# Patient Record
Sex: MALE | Race: WHITE | URBAN - METROPOLITAN AREA
[De-identification: names, ages, dates, MRNs, and addresses within clinical notes are randomized per-mention and may not be internally consistent; named-entity substitution may affect disease eponyms.]

---

## 2022-11-09 ENCOUNTER — HOSPITAL ENCOUNTER (OUTPATIENT)
Dept: PHYSICAL THERAPY | Age: 62
Discharge: HOME | End: 2022-11-09
Payer: COMMERCIAL

## 2022-11-09 PROCEDURE — 97162 PT EVAL MOD COMPLEX 30 MIN: CPT

## 2022-11-09 PROCEDURE — 97110 THERAPEUTIC EXERCISES: CPT

## 2022-11-09 PROCEDURE — 97535 SELF CARE MNGMENT TRAINING: CPT

## 2022-11-09 NOTE — PROGRESS NOTES
PHYSICAL THERAPY - DAILY TREATMENT NOTE    Patient Name: Devon Bowen        Date: 2022  : 1960   YES Patient  Verified  Visit #:     Insurance: Payor: Valent[employer] [place] / Plan: [address] [place] [address] [employer] / Product Type: HMO /      In time: 825 Out time: 518   Total Treatment Time: 50     BCBS/Medicare Time Tracking (below)   Total Timed Codes (min):  NA 1:1 Treatment Time:  NA     TREATMENT AREA =  Left ankle pain [M25.572]    SUBJECTIVE  Pain Level (on 0 to 10 scale):  2-4  / 10   Medication Changes/New allergies or changes in medical history, any new surgeries or procedures? NO    If yes, update Summary List   Subjective Functional Status/Changes:  []  No changes reported     Patient is a 58 y.o. male who presents to [facility] with complaints of L foot/ankle pain. Pt reports twisting ankle during pickle ball in early September and then feeling pop and strong pain in L calf. Modalities Rationale:     decrease edema, decrease inflammation, and decrease pain to improve patient's ability to perform pain-free ADLs.     min [] Estim, type/location:                                      []  att     []  unatt     []  w/US     []  w/ice    []  w/heat    min []  Mechanical Traction: type/lbs                   []  pro   []  sup   []  int   []  cont    []  before manual    []  after manual    min []  Ultrasound, settings/location:      min []  Iontophoresis w/ dexamethasone, location:                                               []  take home patch       []  in clinic   10 min [x]  Ice     []  Heat    location/position: L calf in longsit    min []  Vasopneumatic Device, press/temp:    If using vaso (only need to measure limb vaso being performed on)      pre-treatment girth :       post-treatment girth :       measured at (landmark location) :      min []  Other:    [x] Skin assessment post-treatment (if applicable):    [x]  intact    [x]  redness- no adverse reaction                  []redness - adverse reaction:        15 min Therapeutic Exercise:  [x]  See flow sheet   Rationale:      increase ROM, increase strength, improve coordination, and increase proprioception to improve the patients ability to perform unlimited ADLs. 10 min Self Care: Discussed activity modification and improtance of icing in order to dec inflammation and allow for appropriate tendon loading   Rationale:    increase ROM, increase strength, improve coordination, and increase proprioception to improve the patients ability to decrease symptoms    Billed With/As:   [x] TE   [] TA   [] Neuro   [] Self Care Patient Education: [x] Review HEP    [] Progressed/Changed HEP based on:   [] positioning   [] body mechanics   [] transfers   [] heat/ice application    [] other:      Other Objective/Functional Measures:    FOTO 51     Post Treatment Pain Level (on 0 to 10) scale:   3  / 10     ASSESSMENT  Assessment/Changes in Function:     See POC     []  See Progress Note/Recertification   Patient will continue to benefit from skilled PT services to modify and progress therapeutic interventions, address functional mobility deficits, address ROM deficits, address strength deficits, analyze and address soft tissue restrictions, and analyze and cue movement patterns to attain remaining goals. Progress toward goals / Updated goals:    See POC for new short and lia term goals.      PLAN  [x]  Upgrade activities as tolerated YES Continue plan of care   []  Discharge due to :    []  Other:      Therapist: Leni Kahn DPT    Date: 11/9/2022 Time: 1:44 PM     Future Appointments   Date Time Provider Clinton Baron   11/28/2022  8:40 AM Kaylie Davey PT [facility]   12/1/2022  8:40 AM Kaylie Davey PT [facility]

## 2022-11-09 NOTE — PROGRESS NOTES
[address] [place] [address] PHYSICAL THERAPY AT [address] [place] [address]  [address], [address] ,[address], [address]  Phone: [phone]  Fax: [phone] / [address] [address] PHYSICAL THERAPY SERVICES  Patient Name: Narayan Nunez : 1960   Medical   Diagnosis: Left ankle pain [M25.572] Treatment Diagnosis: L ankle pain   Onset Date: 2022     Referral Source: Serina Guevara MD [facility] [place]): 2022   Prior Hospitalization: See medical history Provider #: [id number]   Prior Level of Function: Surfing, pickle ball, kite boarding,    Comorbidities: High blood pressure, hear disease   Medications: Verified on Patient Summary List   The Plan of Care and following information is based on the information from the initial evaluation.   ===========================================================================================  Assessment / key information:  Patient is a 58 y.o. male who presents to [facility] with complaints of L foot/ankle pain. Pt reports twisting ankle during pickle ball in early September and then feeling pop and strong pain in L calf. Pt reports MRI revealed partial tearing in L achilles tendon. Pt reports symptoms have improved since September but he continues to experience pain with push off and ascending stairs. There is some moderate swelling along proximal achilles tendon. Pt reports some improvement with aircast but did not have relief of symptoms with heel lift. He is taking Maloxicam for pain and icing as needed if symptoms increase. Pt ambulates with short step lengths and dec push off on L. Sit to stand reveals inc pes planus collapse on L side.     Patient presents with the following:  AROM:L ankle: 5 deg DF, 50 deg PF, 30 deg IN, 15 deg EV; R ankle full and pain-free  PROM:similar with inc in DF by 5 deg; 15 deg DF with knee bent indicating limited gastroc length  MMT: PF 2/5, DF 4/5, IN 4-/5, EV 4-/5; R ankle 5/5  Special tests: no tenderness along ATFL. - ant drawer test    Patient signs and sx are consistent with achilles tendinopathy/partial tear of achilles tendon. An initial HEP was provided to address above deficits. Physical Therapy services will be beneficial in order to decrease pain, improve ROM strength and stability of L foot/ankle in order to resume recreational activity and return to PLOF.   ===========================================================================================  Eval Complexity: History MEDIUM  Complexity : 1-2 comorbidities / personal factors will impact the outcome/ POC ;  Examination  MEDIUM Complexity : 3 Standardized tests and measures addressing body structure, function, activity limitation and / or participation in recreation ; Presentation MEDIUM Complexity : Evolving with changing characteristics ; Decision Making MEDIUM Complexity : FOTO score of 26-74; Overall Complexity MEDIUM  Problem List: pain affecting function, decrease ROM, decrease strength, edema affecting function, impaired gait/ balance, decrease ADL/ functional abilitiies, decrease activity tolerance, decrease flexibility/ joint mobility, and decrease transfer abilities   Treatment Plan may include any combination of the following: Therapeutic exercise, Neuromuscular reeducation, Manual therapy, Therapeutic activity, Self care/home management, Electric stim unattended , Vasopneumatic device, and Gait training  Patient / Family readiness to learn indicated by: asking questions, trying to perform skills, and interest  Persons(s) to be included in education: patient (P)  Barriers to Learning/Limitations: None  Measures taken, if barriers to learning:    Patient Goal (s): \"Pain relief and return to normal\"   Patient self reported health status: good  Rehabilitation Potential: good  Short Term Goals: To be accomplished in  3  weeks:  Patient will be independent and compliant with initial HEP.   Patient will demonstrate improved L ankle DF to 10 deg in order to improve gait mechanics  Demonstrate ability to perform 30 seated heel raise without discomfort in order to resume standing strengthening. Long Term Goals: To be accomplished in  6  weeks:  Patient will be independent and compliant with progressive HEP for L ankle stability in order to maintain gains made with physical therapy  Improve FOTO score from 51 to > or = 73 in order to indicate improved ease with ADL's. Demonstrate ability to perform 3x10 eccentric heel raises with good stability in order to resume recreational activity. Report ability to ambulate 30 min with pain remaining below 2/10 in order to resume recreational fitness program.   Frequency / Duration:   Patient to be seen  2  times per week for 6  weeks:  Patient / Caregiver education and instruction: self care, activity modification, brace/ splint application, and exercises  Therapist Signature: Sindy Hadley DPT Date: 51/0/8514   Certification Period: NA Time: 8:22 AM   ===========================================================================================  I certify that the above Physical Therapy Services are being furnished while the patient is under my care. I agree with the treatment plan and certify that this therapy is necessary. Physician Signature:        Date:       Time:     Please sign and return to [facility] or you may fax the signed copy to [phone]. Thank you.

## 2022-11-28 ENCOUNTER — HOSPITAL ENCOUNTER (OUTPATIENT)
Dept: PHYSICAL THERAPY | Age: 62
Discharge: HOME | End: 2022-11-28
Payer: COMMERCIAL

## 2022-11-28 PROCEDURE — 97530 THERAPEUTIC ACTIVITIES: CPT

## 2022-11-28 PROCEDURE — 97112 NEUROMUSCULAR REEDUCATION: CPT

## 2022-11-28 PROCEDURE — 97110 THERAPEUTIC EXERCISES: CPT

## 2022-11-28 NOTE — PROGRESS NOTES
PHYSICAL THERAPY - DAILY TREATMENT NOTE    Patient Name: Brandy Ramirez        Date: 2022  : 1960   YES Patient  Verified  Visit #:   2   of   12  Insurance: Payor: Willa Sant[place] / Plan: [employer] HMO / Product Type: HMO /      In time: 840 Out time: 930   Total Treatment Time: 50     BCBS/Medicare Time Tracking (below)   Total Timed Codes (min):  NA 1:1 Treatment Time:  NA     TREATMENT AREA =  Left ankle pain [M25.572]    SUBJECTIVE  Pain Level (on 0 to 10 scale):  2  / 10   Medication Changes/New allergies or changes in medical history, any new surgeries or procedures? NO    If yes, update Summary List   Subjective Functional Status/Changes:  []  No changes reported     Pt reports he has been doing exercises daily  and has begun the calf raise machine at the gym. He also wore cowboy boots on  and the heel lift helped; he is now interested in heel lifts that the doctor offered him. Modalities Rationale:     decrease edema, decrease inflammation, and decrease pain to improve patient's ability to perform pain-free ADLs.     min [] Estim, type/location:                                      []  att     []  unatt     []  w/US     []  w/ice    []  w/heat    min []  Mechanical Traction: type/lbs                   []  pro   []  sup   []  int   []  cont    []  before manual    []  after manual    min []  Ultrasound, settings/location:      min []  Iontophoresis w/ dexamethasone, location:                                               []  take home patch       []  in clinic   10 min [x]  Ice     []  Heat    location/position: L achilles supine    min []  Vasopneumatic Device, press/temp:    If using vaso (only need to measure limb vaso being performed on)      pre-treatment girth :       post-treatment girth :       measured at (landmark location) :      min []  Other:    [x] Skin assessment post-treatment (if applicable):    [x]  intact    [x]  redness- no adverse reaction []redness - adverse reaction:        15 min Therapeutic Exercise:  [x]  See flow sheet   Rationale:      increase ROM, increase strength, improve coordination, and increase proprioception to improve the patients ability to perform unlimited ADLs. 15 min Therapeutic Activity: [x]  See flow sheet   Rationale:    increase ROM, increase strength, improve coordination, and increase proprioception to improve the patients ability to negotiate stairs without pain    10 min Neuromuscular Re-ed: [x]  See flow sheet   Rationale:    increase ROM, increase strength, improve coordination, and increase proprioception to improve the patients ability to improve stability       Billed With/As:   [x] TE   [] TA   [] Neuro   [] Self Care Patient Education: [x] Review HEP    [] Progressed/Changed HEP based on:   [] positioning   [] body mechanics   [] transfers   [] heat/ice application    [] other:      Other Objective/Functional Measures:    Initiated TE per FS    Significant improvement in DF ROM today to approx 20 deg     Post Treatment Pain Level (on 0 to 10) scale:   2  / 10     ASSESSMENT  Assessment/Changes in Function:     Pt was able to perform eccentric HR on TG with good control through full ROM but fatigued quickly and required rest breaks. Good tolerance to initial session with some ms soreness/fatigue felt by end of session but no pain reported. []  See Progress Note/Recertification   Patient will continue to benefit from skilled PT services to modify and progress therapeutic interventions, address functional mobility deficits, address ROM deficits, address strength deficits, analyze and address soft tissue restrictions, analyze and cue movement patterns, analyze and modify body mechanics/ergonomics, and assess and modify postural abnormalities to attain remaining goals. Progress toward goals / Updated goals:    Progressing towards STG 3.      PLAN  [x]  Upgrade activities as tolerated YES Continue plan of care   []  Discharge due to :    []  Other:      Therapist: Neida De Dios DPT    Date: 11/28/2022 Time: 11:38 AM     Future Appointments   Date Time Provider Clinton Baron   12/1/2022  8:40 AM Stewart Neumann PT [facility]

## 2022-12-01 ENCOUNTER — HOSPITAL ENCOUNTER (OUTPATIENT)
Dept: PHYSICAL THERAPY | Age: 62
Discharge: HOME | End: 2022-12-01
Payer: COMMERCIAL

## 2022-12-01 PROCEDURE — 97110 THERAPEUTIC EXERCISES: CPT

## 2022-12-01 PROCEDURE — 97112 NEUROMUSCULAR REEDUCATION: CPT

## 2022-12-01 PROCEDURE — 97530 THERAPEUTIC ACTIVITIES: CPT

## 2022-12-01 NOTE — PROGRESS NOTES
PHYSICAL THERAPY - DAILY TREATMENT NOTE    Patient Name: Zac Delaney        Date: 2022  : 1960   YES Patient  Verified  Visit #:   3   of   12  Insurance: Payor: Tawanda Soares / Plan: Lisa [place] HMO / Product Type: HMO /      In time: 866 Out time: 605   Total Treatment Time: 50     BCBS/Medicare Time Tracking (below)   Total Timed Codes (min):  NA 1:1 Treatment Time:  NA     TREATMENT AREA =  Left ankle pain [M25.572]    SUBJECTIVE  Pain Level (on 0 to 10 scale):  2-3  / 10   Medication Changes/New allergies or changes in medical history, any new surgeries or procedures? NO    If yes, update Summary List   Subjective Functional Status/Changes:  []  No changes reported     Pt reports he played 9 holes of golf on Wednesday without his cast and has had some soreness, He feels stronger but still has trouble walking uphill         Modalities Rationale:     decrease edema, decrease inflammation, and decrease pain to improve patient's ability to perform pain-free ADLs.     min [] Estim, type/location:                                      []  att     []  unatt     []  w/US     []  w/ice    []  w/heat    min []  Mechanical Traction: type/lbs                   []  pro   []  sup   []  int   []  cont    []  before manual    []  after manual    min []  Ultrasound, settings/location:      min []  Iontophoresis w/ dexamethasone, location:                                               []  take home patch       []  in clinic   10 min [x]  Ice     []  Heat    location/position: L achilles supine    min []  Vasopneumatic Device, press/temp:    If using vaso (only need to measure limb vaso being performed on)      pre-treatment girth :       post-treatment girth :       measured at (landmark location) :      min []  Other:    [x] Skin assessment post-treatment (if applicable):    [x]  intact    [x]  redness- no adverse reaction                  []redness - adverse reaction:        15 min Therapeutic Exercise:  [x]  See flow sheet   Rationale:      increase ROM, increase strength, improve coordination, and increase proprioception to improve the patients ability to perform unlimited ADLs. 15 min Therapeutic Activity: [x]  See flow sheet   Rationale:    increase ROM, increase strength, improve coordination, and increase proprioception to improve the patients ability to negotiate stairs without pain    10 min Neuromuscular Re-ed: [x]  See flow sheet   Rationale:    increase ROM, increase strength, improve coordination, and increase proprioception to improve the patients ability to improve stability       Billed With/As:   [x] TE   [] TA   [] Neuro   [] Self Care Patient Education: [x] Review HEP    [] Progressed/Changed HEP based on:   [] positioning   [] body mechanics   [] transfers   [] heat/ice application    [] other:      Other Objective/Functional Measures: Added standing tilt board FWD/Back, increased PRE's as appropriate     Post Treatment Pain Level (on 0 to 10) scale:   2 / 10     ASSESSMENT  Assessment/Changes in Function:     Cues to perform HR through full ROM and improve eccentric control while lowering. Some lateral collapse that was corrected with cues; plan to work to improve post tib strength. []  See Progress Note/Recertification   Patient will continue to benefit from skilled PT services to modify and progress therapeutic interventions, address functional mobility deficits, address ROM deficits, address strength deficits, analyze and address soft tissue restrictions, analyze and cue movement patterns, analyze and modify body mechanics/ergonomics, and assess and modify postural abnormalities to attain remaining goals. Progress toward goals / Updated goals:    Progressing towards STG 3.      PLAN  [x]  Upgrade activities as tolerated YES Continue plan of care   []  Discharge due to :    []  Other:      Therapist: jM Drake DPT    Date: 12/1/2022 Time: 11:38 AM     Future Appointments   Date Time Provider Clinton Baron   12/6/2022 12:20 PM Amy Hardin, PT [facility]

## 2022-12-05 ENCOUNTER — HOSPITAL ENCOUNTER (OUTPATIENT)
Dept: PHYSICAL THERAPY | Age: 62
Discharge: HOME | End: 2022-12-05
Payer: COMMERCIAL

## 2022-12-05 PROCEDURE — 97112 NEUROMUSCULAR REEDUCATION: CPT

## 2022-12-05 PROCEDURE — 97110 THERAPEUTIC EXERCISES: CPT

## 2022-12-05 PROCEDURE — 97530 THERAPEUTIC ACTIVITIES: CPT

## 2022-12-05 NOTE — PROGRESS NOTES
PHYSICAL THERAPY - DAILY TREATMENT NOTE    Patient Name: Stephy Ruiz        Date: 2022  : 1960   YES Patient  Verified  Visit #:   4   of   12  Insurance: Payor: Rosio Stern / Plan: Natalia [place] HMO / Product Type: HMO /      In time: 800 Out time: 850   Total Treatment Time: 50     BCBS/Medicare Time Tracking (below)   Total Timed Codes (min):  NA 1:1 Treatment Time:  NA     TREATMENT AREA =  Left ankle pain [M25.572]    SUBJECTIVE  Pain Level (on 0 to 10 scale):  2-3  / 10   Medication Changes/New allergies or changes in medical history, any new surgeries or procedures? NO    If yes, update Summary List   Subjective Functional Status/Changes:  []  No changes reported     Pt reports there is always a steady ache in his calf. Modalities Rationale:     decrease edema, decrease inflammation, and decrease pain to improve patient's ability to perform pain-free ADLs.     min [] Estim, type/location:                                      []  att     []  unatt     []  w/US     []  w/ice    []  w/heat    min []  Mechanical Traction: type/lbs                   []  pro   []  sup   []  int   []  cont    []  before manual    []  after manual    min []  Ultrasound, settings/location:      min []  Iontophoresis w/ dexamethasone, location:                                               []  take home patch       []  in clinic   10 min [x]  Ice     []  Heat    location/position: L achilles supine    min []  Vasopneumatic Device, press/temp:    If using vaso (only need to measure limb vaso being performed on)      pre-treatment girth :       post-treatment girth :       measured at (landmark location) :      min []  Other:    [x] Skin assessment post-treatment (if applicable):    [x]  intact    [x]  redness- no adverse reaction                  []redness - adverse reaction:        15 min Therapeutic Exercise:  [x]  See flow sheet   Rationale:      increase ROM, increase strength, improve coordination, and increase proprioception to improve the patients ability to perform unlimited ADLs. 15 min Therapeutic Activity: [x]  See flow sheet   Rationale:    increase ROM, increase strength, improve coordination, and increase proprioception to improve the patients ability to negotiate stairs without pain    10 min Neuromuscular Re-ed: [x]  See flow sheet   Rationale:    increase ROM, increase strength, improve coordination, and increase proprioception to improve the patients ability to improve stability       Billed With/As:   [x] TE   [] TA   [] Neuro   [] Self Care Patient Education: [x] Review HEP    [] Progressed/Changed HEP based on:   [] positioning   [] body mechanics   [] transfers   [] heat/ice application    [] other:      Other Objective/Functional Measures: Added standing HR 60/40 R/L, added rebounder toss on blue pad    Some swelling remains in L achilles tendon     Post Treatment Pain Level (on 0 to 10) scale:   2  / 10     ASSESSMENT  Assessment/Changes in Function:     Eccentric training on TG was slightly provocative today as expected. Pt was again encouraged to focus on maintaining low levels of pain during the week, ice frequently and look into heel lift for symptom management. []  See Progress Note/Recertification   Patient will continue to benefit from skilled PT services to modify and progress therapeutic interventions, address functional mobility deficits, address ROM deficits, address strength deficits, analyze and address soft tissue restrictions, analyze and cue movement patterns, analyze and modify body mechanics/ergonomics, and assess and modify postural abnormalities to attain remaining goals. Progress toward goals / Updated goals:    Progressing towards STG 3.      PLAN  [x]  Upgrade activities as tolerated YES Continue plan of care   []  Discharge due to :    []  Other:      Therapist: Patric Barthel, DPT    Date: 12/5/2022 Time: 11:38 AM     Future Appointments   Date Time Provider Clinton Baron   12/13/2022  9:00 AM Emerson Ricci, PT [facility]   12/15/2022  9:20 AM Emerson Ricci, PT [facility]   12/19/2022  9:20 AM Emerson Ricci, PT [facility]   12/22/2022  9:20 AM Emerson Ricci, PT [facility]   12/27/2022  9:40 AM Emerson Ricci, PT [facility]   12/30/2022  9:20 AM Emerson Ricci, PT [facility]

## 2022-12-06 ENCOUNTER — APPOINTMENT (OUTPATIENT)
Dept: PHYSICAL THERAPY | Age: 62
End: 2022-12-06
Payer: COMMERCIAL

## 2022-12-12 ENCOUNTER — TELEPHONE (OUTPATIENT)
Dept: PHYSICAL THERAPY | Age: 62
End: 2022-12-12

## 2022-12-12 ENCOUNTER — HOSPITAL ENCOUNTER (OUTPATIENT)
Dept: PHYSICAL THERAPY | Age: 62
Discharge: HOME | End: 2022-12-12
Payer: COMMERCIAL

## 2022-12-12 PROCEDURE — 97110 THERAPEUTIC EXERCISES: CPT

## 2022-12-12 PROCEDURE — 97530 THERAPEUTIC ACTIVITIES: CPT

## 2022-12-12 PROCEDURE — 97112 NEUROMUSCULAR REEDUCATION: CPT

## 2022-12-12 NOTE — PROGRESS NOTES
PHYSICAL THERAPY - DAILY TREATMENT NOTE    Patient Name: Orlando Krishnamurthy        Date: 2022  : 1960   YES Patient  Verified  Visit #:     Insurance: Payor: Davi Potter / Plan: Micah Aguilar HMO / Product Type: HMO /      In time: 1000 Out time: 1050   Total Treatment Time: 50     BCBS/Medicare Time Tracking (below)   Total Timed Codes (min):  NA 1:1 Treatment Time:  NA     TREATMENT AREA =  Left ankle pain [M25.572]    SUBJECTIVE  Pain Level (on 0 to 10 scale):  2-3   10   Medication Changes/New allergies or changes in medical history, any new surgeries or procedures? NO    If yes, update Summary List   Subjective Functional Status/Changes:  []  No changes reported     Pt reports he felt really good last week until he played 18 holes of golf and walked. That seems to  be the most aggravating thing         Modalities Rationale:     decrease edema, decrease inflammation, and decrease pain to improve patient's ability to perform pain-free ADLs.     min [] Estim, type/location:                                      []  att     []  unatt     []  w/US     []  w/ice    []  w/heat    min []  Mechanical Traction: type/lbs                   []  pro   []  sup   []  int   []  cont    []  before manual    []  after manual    min []  Ultrasound, settings/location:      min []  Iontophoresis w/ dexamethasone, location:                                               []  take home patch       []  in clinic   10 min [x]  Ice     []  Heat    location/position: L achilles supine    min []  Vasopneumatic Device, press/temp:    If using vaso (only need to measure limb vaso being performed on)      pre-treatment girth :       post-treatment girth :       measured at (landmark location) :      min []  Other:    [x] Skin assessment post-treatment (if applicable):    [x]  intact    [x]  redness- no adverse reaction                  []redness - adverse reaction:        15 min Therapeutic Exercise:  [x]  See flow sheet Rationale:      increase ROM, increase strength, improve coordination, and increase proprioception to improve the patients ability to perform unlimited ADLs. 15 min Therapeutic Activity: [x]  See flow sheet   Rationale:    increase ROM, increase strength, improve coordination, and increase proprioception to improve the patients ability to negotiate stairs without pain    10 min Neuromuscular Re-ed: [x]  See flow sheet   Rationale:    increase ROM, increase strength, improve coordination, and increase proprioception to improve the patients ability to improve stability       Billed With/As:   [x] TE   [] TA   [] Neuro   [] Self Care Patient Education: [x] Review HEP    [] Progressed/Changed HEP based on:   [] positioning   [] body mechanics   [] transfers   [] heat/ice application    [] other:      Other Objective/Functional Measures: Added towel scrunch and mobo board     Post Treatment Pain Level (on 0 to 10) scale:   2  / 10     ASSESSMENT  Assessment/Changes in Function:     Discussed purchasing insoles to control for pes planus collapse during walking and excessive lateral movements to compensate for this collapse. Good tolerance to progression today although weakness with L PF remains     []  See Progress Note/Recertification   Patient will continue to benefit from skilled PT services to modify and progress therapeutic interventions, address functional mobility deficits, address ROM deficits, address strength deficits, analyze and address soft tissue restrictions, analyze and cue movement patterns, analyze and modify body mechanics/ergonomics, and assess and modify postural abnormalities to attain remaining goals. Progress toward goals / Updated goals:    Progressing towards STG 3.      PLAN  [x]  Upgrade activities as tolerated YES Continue plan of care   []  Discharge due to :    []  Other:      Therapist: Patric Barthel, DPT    Date: 12/12/2022 Time: 11:38 AM     Future Appointments   Date Time Provider Department Center   12/15/2022  9:20 AM Devang Thayer, PT [facility]   12/19/2022  8:00 AM Devang Thayer, PT [facility]   12/22/2022  9:20 AM Devang Thayer, PT [facility]   12/27/2022  9:40 AM Devang Thayer, PT [facility]   12/30/2022  9:20 AM Devang Thayer, PT [facility]

## 2022-12-13 ENCOUNTER — APPOINTMENT (OUTPATIENT)
Dept: PHYSICAL THERAPY | Age: 62
End: 2022-12-13
Payer: COMMERCIAL

## 2022-12-15 ENCOUNTER — HOSPITAL ENCOUNTER (OUTPATIENT)
Dept: PHYSICAL THERAPY | Age: 62
Discharge: HOME | End: 2022-12-15
Payer: COMMERCIAL

## 2022-12-15 PROCEDURE — 97112 NEUROMUSCULAR REEDUCATION: CPT

## 2022-12-15 PROCEDURE — 97530 THERAPEUTIC ACTIVITIES: CPT

## 2022-12-15 PROCEDURE — 97110 THERAPEUTIC EXERCISES: CPT

## 2022-12-15 NOTE — PROGRESS NOTES
[address] [place] [address] PHYSICAL THERAPY AT [address] [place] [address] [address], [address] [place] [address], [address]  Phone: [phone]  Fax: [phone]  PROGRESS NOTE  Patient Name: Cathy Billy : 1960   Treatment/Medical Diagnosis: Left ankle pain [M25.572]   Referral Source: Naida Hammans, MD     Date of Initial Visit: 2022 Attended Visits: 6 Missed Visits: 0     SUMMARY OF TREATMENT  Stretching and strengthening of L ankle, balance/proprioception training, strengthening of L gastroc/soleus complex, patient education and gait training, CP to dec inflammation  CURRENT STATUS  Mr. Ivette Elizondo has been seen for 5 follow up visits and is demonstrating slow but steady progress in strength. Pt reports a constant 2-3/10 aching pain in L achilles that increases to a 5/10 when walking long distance or standing to pedal on his bike. Pt is showing slow improvements in gastroc strength with improved push off during ambulation and minimal limp. He still is unable to perform HR in standing without assistance from RLE and UE. Pt has been extensively educated on importance of maintaining low levels of pain and inflammation in order to improve tissue extensibility and tensile strength. Pt has transitioned out of air cast and is using arch support with minor heel lift in shoes to control amount of pronation and dec load required of achilles tendon. See below for objective measures:     Goal/Measure of Progress Goal Met? 1. Patient will be independent and compliant with initial HEP. Status at last Eval: - Current Status: Independent, compliant daily yes   2. Patient will demonstrate improved L ankle DF to 10 deg in order to improve gait mechanics   Status at last Eval: 5 deg DF Current Status: 20 deg DF yes   3. Demonstrate ability to perform 30 seated heel raise without discomfort in order to resume standing strengthening.     Status at last Eval: Symptoms with seated HR Current Status: 30 without change in symptoms yes     New Goals to be achieved in __3__  weeks:  1. Patient will be independent and compliant with progressive HEP for L ankle stability in order to maintain gains made with physical therapy   2. Improve FOTO score from 51 to > or = 73 in order to indicate improved ease with ADL's.   3.  Demonstrate ability to perform 3x10 eccentric heel raises with good stability in order to resume recreational activity. 4.  Report ability to ambulate 30 min with pain remaining below 2/10 in order to resume recreational fitness program.        RECOMMENDATIONS  Continue with skilled PT services 2x/week for 3 weeks in order to gradually resume recreational activity and return to pain-free ambulation. Thank you! If you have any questions/comments please contact us directly at [phone]. Thank you for allowing us to assist in the care of your patient. Therapist Signature: Diya Acosta DPT Date: 12/15/2022   Reporting period  Time: 9:23 AM   NOTE TO PHYSICIAN:  PLEASE COMPLETE THE ORDERS BELOW AND FAX TO   [facility] Physical [facility]: [phone]. If you are unable to process this request in 24 hours please contact our office: [phone].    ___ I have read the above report and request that my patient continue as recommended.   ___ I have read the above report and request that my patient continue therapy with the following changes/special instructions:_________________________________________________________   ___ I have read the above report and request that my patient be discharged from therapy.      Physician Signature:        Date:       Time:    Ivan Kahn MD.

## 2022-12-15 NOTE — PROGRESS NOTES
PHYSICAL THERAPY - DAILY TREATMENT NOTE    Patient Name: Ayo Lorenzo        Date: 12/15/2022  : 1960   YES Patient  Verified  Visit #:     Insurance: Payor: Pierre Lund / Plan: [address] [place] [address] [place] [employer] / Product Type: HMO /      In time: 093 Out time: 42   Total Treatment Time: 50     BCBS/Medicare Time Tracking (below)   Total Timed Codes (min):  NA 1:1 Treatment Time:  NA     TREATMENT AREA =  Left ankle pain [M25.572]    SUBJECTIVE  Pain Level (on 0 to 10 scale):  2-3  / 10   Medication Changes/New allergies or changes in medical history, any new surgeries or procedures? NO    If yes, update Summary List   Subjective Functional Status/Changes:  []  No changes reported     Pt reports walking causes strongest symptoms. Otherwise he has been feeling good. Steady ache in L achilles         Modalities Rationale:     decrease edema, decrease inflammation, and decrease pain to improve patient's ability to perform pain-free ADLs.     min [] Estim, type/location:                                      []  att     []  unatt     []  w/US     []  w/ice    []  w/heat    min []  Mechanical Traction: type/lbs                   []  pro   []  sup   []  int   []  cont    []  before manual    []  after manual    min []  Ultrasound, settings/location:      min []  Iontophoresis w/ dexamethasone, location:                                               []  take home patch       []  in clinic   10 min [x]  Ice     []  Heat    location/position: L achilles supine    min []  Vasopneumatic Device, press/temp:    If using vaso (only need to measure limb vaso being performed on)      pre-treatment girth :       post-treatment girth :       measured at (landmark location) :      min []  Other:    [x] Skin assessment post-treatment (if applicable):    [x]  intact    [x]  redness- no adverse reaction                  []redness - adverse reaction:        15 min Therapeutic Exercise:  [x]  See flow sheet   Rationale:      increase ROM, increase strength, improve coordination, and increase proprioception to improve the patients ability to perform unlimited ADLs. 15 min Therapeutic Activity: [x]  See flow sheet   Rationale:    increase ROM, increase strength, improve coordination, and increase proprioception to improve the patients ability to negotiate stairs without pain    10 min Neuromuscular Re-ed: [x]  See flow sheet   Rationale:    increase ROM, increase strength, improve coordination, and increase proprioception to improve the patients ability to improve stability       Billed With/As:   [x] TE   [] TA   [] Neuro   [] Self Care Patient Education: [x] Review HEP    [] Progressed/Changed HEP based on:   [] positioning   [] body mechanics   [] transfers   [] heat/ice application    [] other:      Other Objective/Functional Measures:    See PN to MD    Gait analysis with inserts revealed neutral calcaneus alignment from initial contact to push off; without inserts there was significant pronation collapse and eversion of calcaneous     Post Treatment Pain Level (on 0 to 10) scale:   2  / 10     ASSESSMENT  Assessment/Changes in Function:     See PN to MD     []  See Progress Note/Recertification   Patient will continue to benefit from skilled PT services to modify and progress therapeutic interventions, address functional mobility deficits, address ROM deficits, address strength deficits, analyze and address soft tissue restrictions, analyze and cue movement patterns, analyze and modify body mechanics/ergonomics, and assess and modify postural abnormalities to attain remaining goals. Progress toward goals / Updated goals:    See PN to MD for progress towards goals.       PLAN  [x]  Upgrade activities as tolerated YES Continue plan of care   []  Discharge due to :    []  Other:      Therapist: Kristi Duffy DPT    Date: 12/15/2022 Time: 11:38 AM     Future Appointments   Date Time Provider Clinton Baron   12/19/2022  8:00 AM [facility]   12/22/2022  9:20 AM Ml [facility]argent, [facility]   12/27/2022  9:40 AM Ml Sargent, PT [facility]   12/30/2022  9:20 AM Ml Sargent, PT [facility]

## 2022-12-19 ENCOUNTER — HOSPITAL ENCOUNTER (OUTPATIENT)
Dept: PHYSICAL THERAPY | Age: 62
Discharge: HOME | End: 2022-12-19
Payer: COMMERCIAL

## 2022-12-19 PROCEDURE — 97110 THERAPEUTIC EXERCISES: CPT

## 2022-12-19 PROCEDURE — 97530 THERAPEUTIC ACTIVITIES: CPT

## 2022-12-19 PROCEDURE — 97112 NEUROMUSCULAR REEDUCATION: CPT

## 2022-12-19 NOTE — PROGRESS NOTES
PHYSICAL THERAPY - DAILY TREATMENT NOTE    Patient Name: Yu Westfall        Date: 2022  : 1960   YES Patient  Verified  Visit #:     Insurance: Payor: Moody Mina / Plan: [address] [place] [address] [employer] [place] HMO / Product Type: HMO /      In time: 800 Out time: 845   Total Treatment Time: 45     BCBS/Medicare Time Tracking (below)   Total Timed Codes (min):  NA 1:1 Treatment Time:  NA     TREATMENT AREA =  Left ankle pain [M25.572]    SUBJECTIVE  Pain Level (on 0 to 10 scale):  1-2  / 10   Medication Changes/New allergies or changes in medical history, any new surgeries or procedures? NO    If yes, update Summary List   Subjective Functional Status/Changes:  []  No changes reported     Pt reports he feels he is getting stronger and resting discomfort is decreasing. Supportive shoes have helped a lot. Modalities Rationale:     decrease edema, decrease inflammation, and decrease pain to improve patient's ability to perform pain-free ADLs.     min [] Estim, type/location:                                      []  att     []  unatt     []  w/US     []  w/ice    []  w/heat    min []  Mechanical Traction: type/lbs                   []  pro   []  sup   []  int   []  cont    []  before manual    []  after manual    min []  Ultrasound, settings/location:      min []  Iontophoresis w/ dexamethasone, location:                                               []  take home patch       []  in clinic   10 min [x]  Ice     []  Heat    location/position: L leg in long sitting    min []  Vasopneumatic Device, press/temp:    If using vaso (only need to measure limb vaso being performed on)      pre-treatment girth :       post-treatment girth :       measured at (landmark location) :      min []  Other:    [x] Skin assessment post-treatment (if applicable):    [x]  intact    [x]  redness- no adverse reaction                  []redness - adverse reaction:        10 min Therapeutic Exercise:  [x]  See flow sheet   Rationale: increase ROM, increase strength, improve coordination, and increase proprioception to improve the patients ability to perform unlimited ADLs. 10 min Therapeutic Activity: [x]  See flow sheet   Rationale:    increase ROM, increase strength, improve coordination, and increase proprioception to improve the patients ability to negotiate stairs and curbs    15 min Neuromuscular Re-ed: [x]  See flow sheet   Rationale:    increase ROM, increase strength, improve coordination, and increase proprioception to improve the patients ability to improve stability with ambulaiton    Billed With/As:   [x] TE   [] TA   [] Neuro   [] Self Care Patient Education: [x] Review HEP    [] Progressed/Changed HEP based on:   [] positioning   [] body mechanics   [] transfers   [] heat/ice application    [] other:      Other Objective/Functional Measures:    TE per FS     Post Treatment Pain Level (on 0 to 10) scale:   0  / 10     ASSESSMENT  Assessment/Changes in Function:     Pt is showing improved tolerance and control to standing HR with less UE support required. Educated pt to perform HR to fatigue 2x/day at home and maintain pain levels under 5/10. []  See Progress Note/Recertification   Patient will continue to benefit from skilled PT services to modify and progress therapeutic interventions, address functional mobility deficits, address ROM deficits, address strength deficits, analyze and address soft tissue restrictions, analyze and cue movement patterns, analyze and modify body mechanics/ergonomics, and assess and modify postural abnormalities to attain remaining goals. Progress toward goals / Updated goals:    Progressing towards LTG 3.      PLAN  [x]  Upgrade activities as tolerated YES Continue plan of care   []  Discharge due to :    []  Other:      Therapist: Rosa Nuñez DPT    Date: 12/19/2022 Time: 7:55 AM     Future Appointments   Date Time Provider Clinton Baron   12/19/2022  8:00 AM Suzan Wu PT [facility]   12/22/2022  9:20 AM Roseanna Castro, PT [facility]   12/27/2022  9:40 AM Roseanna Castro, PT [facility]   12/30/2022  9:20 AM Roseanna Castro, PT [facility]

## 2022-12-22 ENCOUNTER — HOSPITAL ENCOUNTER (OUTPATIENT)
Dept: PHYSICAL THERAPY | Age: 62
Discharge: HOME | End: 2022-12-22
Payer: COMMERCIAL

## 2022-12-22 PROCEDURE — 97110 THERAPEUTIC EXERCISES: CPT

## 2022-12-22 PROCEDURE — 97530 THERAPEUTIC ACTIVITIES: CPT

## 2022-12-22 PROCEDURE — 97112 NEUROMUSCULAR REEDUCATION: CPT

## 2022-12-22 NOTE — PROGRESS NOTES
PHYSICAL THERAPY - DAILY TREATMENT NOTE    Patient Name: Angela De La Rosa        Date: 2022  : 1960   YES Patient  Verified  Visit #:   8   of   12  Insurance: Payor: Von Salgueroch / Plan: [employer] HMO / Product Type: HMO /      In time: 906 Out time:    Total Treatment Time: 45     BCBS/Medicare Time Tracking (below)   Total Timed Codes (min):  NA 1:1 Treatment Time:  NA     TREATMENT AREA =  Left ankle pain [M25.572]    SUBJECTIVE  Pain Level (on 0 to 10 scale):  1-2  / 10   Medication Changes/New allergies or changes in medical history, any new surgeries or procedures? NO    If yes, update Summary List   Subjective Functional Status/Changes:  []  No changes reported     Pain is decreasing but remains a steady ache. He is still unable to stand on the bike. Modalities Rationale:     decrease edema, decrease inflammation, and decrease pain to improve patient's ability to perform pain-free ADLs.     min [] Estim, type/location:                                      []  att     []  unatt     []  w/US     []  w/ice    []  w/heat    min []  Mechanical Traction: type/lbs                   []  pro   []  sup   []  int   []  cont    []  before manual    []  after manual    min []  Ultrasound, settings/location:      min []  Iontophoresis w/ dexamethasone, location:                                               []  take home patch       []  in clinic   10 min [x]  Ice     []  Heat    location/position: L leg in long sitting    min []  Vasopneumatic Device, press/temp:    If using vaso (only need to measure limb vaso being performed on)      pre-treatment girth :       post-treatment girth :       measured at (landmark location) :      min []  Other:    [x] Skin assessment post-treatment (if applicable):    [x]  intact    [x]  redness- no adverse reaction                  []redness - adverse reaction:        15 min Therapeutic Exercise:  [x]  See flow sheet   Rationale:      increase ROM, increase strength, improve coordination, and increase proprioception to improve the patients ability to perform unlimited ADLs. 15 min Therapeutic Activity: [x]  See flow sheet   Rationale:    increase ROM, increase strength, improve coordination, and increase proprioception to improve the patients ability to negotiate stairs and curbs    15 min Neuromuscular Re-ed: [x]  See flow sheet   Rationale:    increase ROM, increase strength, improve coordination, and increase proprioception to improve the patients ability to improve stability with ambulaiton    Billed With/As:   [x] TE   [] TA   [] Neuro   [] Self Care Patient Education: [x] Review HEP    [] Progressed/Changed HEP based on:   [] positioning   [] body mechanics   [] transfers   [] heat/ice application    [] other:      Other Objective/Functional Measures: Added mobo 2 way and lateral step down on 4\" box    Performed standing HR on ground and on stair     Post Treatment Pain Level (on 0 to 10) scale:   0  / 10     ASSESSMENT  Assessment/Changes in Function:     Weakness remains in L gastroc but is improving with each visit. Pt was educated on importance of maintaining low inflammation during the holidays and wearing supportive shoes or brace to dec load required from tendon. Good verbal understanding. []  See Progress Note/Recertification   Patient will continue to benefit from skilled PT services to modify and progress therapeutic interventions, address functional mobility deficits, address ROM deficits, address strength deficits, analyze and address soft tissue restrictions, analyze and cue movement patterns, analyze and modify body mechanics/ergonomics, and assess and modify postural abnormalities to attain remaining goals. Progress toward goals / Updated goals:    Progressing towards LTG 3.      PLAN  [x]  Upgrade activities as tolerated YES Continue plan of care   []  Discharge due to :    []  Other:      Therapist: Rabia Dubois DPT    Date: 12/22/2022 Time: 7:55 AM     Future Appointments   Date Time Provider Clinton Baron   12/27/2022  9:40 AM Suzan Wu, PT [facility]   12/30/2022  9:20 AM Suzan Wu PT [facility]

## 2022-12-27 ENCOUNTER — HOSPITAL ENCOUNTER (OUTPATIENT)
Dept: PHYSICAL THERAPY | Age: 62
Discharge: HOME | End: 2022-12-27
Payer: COMMERCIAL

## 2022-12-27 PROCEDURE — 97110 THERAPEUTIC EXERCISES: CPT

## 2022-12-27 PROCEDURE — 97530 THERAPEUTIC ACTIVITIES: CPT

## 2022-12-27 PROCEDURE — 97112 NEUROMUSCULAR REEDUCATION: CPT

## 2022-12-27 NOTE — PROGRESS NOTES
[address] [place] [address] PHYSICAL THERAPY AT [address] [place] [address] [address], [address] [place] [address], [address]  Phone: [phone]  Fax: [phone] SUMMARY  Patient Name: Svetlana Gould : 1960   Treatment/Medical Diagnosis: Left ankle pain [M25.572]   Referral Source: Alexander Hernandez MD     Date of Initial Visit: 2022 Attended Visits: 9 Missed Visits: 0     SUMMARY OF TREATMENT  Stretching and strengthening of L ankle, balance/proprioception training, strengthening of L gastroc/soleus complex, patient education and gait training, CP to dec inflammation  CURRENT STATUS  Mr. Martha Andrea has been seen for 7 follow up visits and is demonstrating consistent improvement in strength and activity tolerance of LLE. Pt reports average pain approx 1/10 with flare up to 3/10 at worst when walking for long distances. Pt is now able to perform Heel raises standing with UE support but is able to maintain full range only for 16 reps. Pt is being discharged at this time due to authorization expiration at the end of the calendar year; he has been educated on ways to progressively return to biking and recreational walking program while maintaining low levels of pain. Gastroc strength has significantly improved during PT sessions and pt is independent in strength training program at the gym and at home that maintains low levels of pain/inflammation. See below for objective measures:     Goal/Measure of Progress Goal Met? 1. Patient will be independent and compliant with progressive HEP for L ankle stability in order to maintain gains made with physical therapy   Status at last Eval: - Current Status: Independent and compliant 3-4 days/week yes   2. Improve FOTO score from 51 to > or = 73 in order to indicate improved ease with ADL's. Status at last Eval: 51 Current Status: 73 yes   3.   Demonstrate ability to perform 3x10 eccentric heel raises with good stability in order to resume recreational activity. Status at last Eval: Unable  Current Status: 3x10 eccentric HR with some UE support required at higher reptitions yes   4. Report ability to ambulate 30 min with pain remaining below 2/10 in order to resume recreational fitness program.    Status at last Eval: Unable; 7/10 pain after 10 min Current Status: 30 min prior to onset of pain; continuing causes 5-6/10 pain  yes     RECOMMENDATIONS  Discontinue therapy. Progressing towards or have reached established goals. If you have any questions/comments please contact us directly at [phone]. Thank you for allowing us to assist in the care of your patient.     Therapist Signature: Chio Barber PT Date: 12/27/2022     Time: 10:14 AM

## 2022-12-27 NOTE — PROGRESS NOTES
PHYSICAL THERAPY - DAILY TREATMENT NOTE    Patient Name: Ayo Lorenzo        Date: 2022  : 1960   YES Patient  Verified  Visit #:     Insurance: Payor: Pierre Lund / Plan: [employer] HMO / Product Type: HMO /      In time: 940 Out time: 1020   Total Treatment Time: 40     BCBS/Medicare Time Tracking (below)   Total Timed Codes (min):  NA 1:1 Treatment Time:  NA     TREATMENT AREA =  Left ankle pain [M25.572]    SUBJECTIVE  Pain Level (on 0 to 10 scale):  1  / 10   Medication Changes/New allergies or changes in medical history, any new surgeries or procedures? NO    If yes, update Summary List   Subjective Functional Status/Changes:  []  No changes reported     Pt reports he has minimal pain but is nervous to resume exercises. Modalities Rationale:     decrease edema, decrease inflammation, and decrease pain to improve patient's ability to perform pain-free ADLs.     min [] Estim, type/location:                                      []  att     []  unatt     []  w/US     []  w/ice    []  w/heat    min []  Mechanical Traction: type/lbs                   []  pro   []  sup   []  int   []  cont    []  before manual    []  after manual    min []  Ultrasound, settings/location:      min []  Iontophoresis w/ dexamethasone, location:                                               []  take home patch       []  in clinic   PD min [x]  Ice     []  Heat    location/position: L leg in long sitting    min []  Vasopneumatic Device, press/temp:    If using vaso (only need to measure limb vaso being performed on)      pre-treatment girth :       post-treatment girth :       measured at (landmark location) :      min []  Other:    [x] Skin assessment post-treatment (if applicable):    [x]  intact    [x]  redness- no adverse reaction                  []redness - adverse reaction:        15 min Therapeutic Exercise:  [x]  See flow sheet   Rationale:      increase ROM, increase strength, improve coordination, and increase proprioception to improve the patients ability to perform unlimited ADLs.       15 min Therapeutic Activity: [x]  See flow sheet   Rationale:    increase ROM, increase strength, improve coordination, and increase proprioception to improve the patients ability to negotiate stairs and curbs    15 min Neuromuscular Re-ed: [x]  See flow sheet   Rationale:    increase ROM, increase strength, improve coordination, and increase proprioception to improve the patients ability to improve stability with ambulaiton    Billed With/As:   [x] TE   [] TA   [] Neuro   [] Self Care Patient Education: [x] Review HEP    [] Progressed/Changed HEP based on:   [] positioning   [] body mechanics   [] transfers   [] heat/ice application    [] other:      Other Objective/Functional Measures:    See DC summary     Post Treatment Pain Level (on 0 to 10) scale:   0  / 10     ASSESSMENT  Assessment/Changes in Function:     See DC summary     []  See Progress Note/Recertification      Progress toward goals / Updated goals:    See Dc Summary to MD     PLAN  []  Upgrade activities as tolerated YES Continue plan of care   [x]  Discharge due to : Transitioning to home program to gradually resume recreational fitness program and recreational walking   []  Other:      Therapist: Vicky Sanchez DPT    Date: 12/27/2022 Time: 7:55 AM     Future Appointments   Date Time Provider Clinton Baron   12/30/2022  9:20 AM Beryle Harvard, PT [facility]

## 2022-12-30 ENCOUNTER — APPOINTMENT (OUTPATIENT)
Dept: PHYSICAL THERAPY | Age: 62
End: 2022-12-30
Payer: COMMERCIAL